# Patient Record
Sex: FEMALE | Race: WHITE | NOT HISPANIC OR LATINO | URBAN - METROPOLITAN AREA
[De-identification: names, ages, dates, MRNs, and addresses within clinical notes are randomized per-mention and may not be internally consistent; named-entity substitution may affect disease eponyms.]

---

## 2023-07-26 ENCOUNTER — APPOINTMENT (RX ONLY)
Dept: URBAN - METROPOLITAN AREA CLINIC 66 | Facility: CLINIC | Age: 43
Setting detail: DERMATOLOGY
End: 2023-07-26

## 2023-07-26 DIAGNOSIS — Z41.9 ENCOUNTER FOR PROCEDURE FOR PURPOSES OTHER THAN REMEDYING HEALTH STATE, UNSPECIFIED: ICD-10-CM

## 2023-07-26 PROCEDURE — ? COSMETIC CONSULTATION: SKIN TIGHTENING

## 2023-07-26 PROCEDURE — ? COSMETIC CONSULTATION: DYSPORT

## 2023-07-26 PROCEDURE — ? COSMETIC CONSULTATION: BOTOX

## 2023-07-26 PROCEDURE — ? DYSPORT

## 2023-07-26 ASSESSMENT — LOCATION DETAILED DESCRIPTION DERM
LOCATION DETAILED: RIGHT MEDIAL FRONTAL SCALP
LOCATION DETAILED: RIGHT LATERAL FOREHEAD
LOCATION DETAILED: LEFT INFERIOR TEMPLE
LOCATION DETAILED: GLABELLA
LOCATION DETAILED: LEFT SUPERIOR LATERAL FOREHEAD
LOCATION DETAILED: RIGHT FOREHEAD
LOCATION DETAILED: LEFT CENTRAL FRONTAL SCALP
LOCATION DETAILED: RIGHT SUPERIOR FOREHEAD
LOCATION DETAILED: LEFT SUPERIOR FOREHEAD
LOCATION DETAILED: LEFT MID TEMPLE
LOCATION DETAILED: RIGHT MID TEMPLE
LOCATION DETAILED: LEFT MEDIAL FRONTAL SCALP
LOCATION DETAILED: RIGHT CENTRAL EYEBROW
LOCATION DETAILED: LEFT MEDIAL EYEBROW
LOCATION DETAILED: RIGHT LATERAL FRONTAL SCALP
LOCATION DETAILED: LEFT FOREHEAD
LOCATION DETAILED: RIGHT SUPERIOR LATERAL FOREHEAD
LOCATION DETAILED: RIGHT MEDIAL EYEBROW
LOCATION DETAILED: LEFT LATERAL FOREHEAD
LOCATION DETAILED: RIGHT MEDIAL FOREHEAD
LOCATION DETAILED: RIGHT INFERIOR TEMPLE

## 2023-07-26 ASSESSMENT — LOCATION SIMPLE DESCRIPTION DERM
LOCATION SIMPLE: RIGHT EYEBROW
LOCATION SIMPLE: RIGHT FOREHEAD
LOCATION SIMPLE: LEFT FOREHEAD
LOCATION SIMPLE: GLABELLA
LOCATION SIMPLE: LEFT EYEBROW
LOCATION SIMPLE: RIGHT SCALP
LOCATION SIMPLE: LEFT TEMPLE
LOCATION SIMPLE: LEFT SCALP
LOCATION SIMPLE: RIGHT TEMPLE

## 2023-07-26 ASSESSMENT — LOCATION ZONE DERM
LOCATION ZONE: FACE
LOCATION ZONE: SCALP

## 2023-07-26 NOTE — PROCEDURE: DYSPORT
Consent: All questions answered.\\n\\n42 units / 126 units DYS\\n15% off 40 units or more July special applied today\\n\\nBOX B 2 units [6 DYS units]\\nBOX #1 18 units [54 DYS] LOT [id number] Exp: 2/28/2025 Consent: All questions answered.\\n\\n42 units / 126 units DYS\\n15% off 40 units or more July special applied today\\n\\nBOX B 2 units [6 DYS units]\\nBOX #1 18 units [54 DYS] LOT [id number] Exp: 2/28/2025

## 2023-08-11 ENCOUNTER — APPOINTMENT (RX ONLY)
Dept: URBAN - METROPOLITAN AREA CLINIC 66 | Facility: CLINIC | Age: 43
Setting detail: DERMATOLOGY
End: 2023-08-11

## 2023-08-11 DIAGNOSIS — Z41.9 ENCOUNTER FOR PROCEDURE FOR PURPOSES OTHER THAN REMEDYING HEALTH STATE, UNSPECIFIED: ICD-10-CM

## 2023-08-11 PROCEDURE — ? COSMETIC FOLLOW-UP

## 2023-08-11 PROCEDURE — ? COSMETIC CONSULTATION: LASER RESURFACING

## 2023-08-11 PROCEDURE — ? COSMETIC CONSULTATION - MICRO-NEEDLING

## 2023-08-11 NOTE — PROCEDURE: COSMETIC FOLLOW-UP
Price (Use Numbers Only, No Special Characters Or $): 0
Comments (Free Text): Satisfied with results
Detail Level: Zone

## 2023-08-25 ENCOUNTER — APPOINTMENT (RX ONLY)
Dept: URBAN - METROPOLITAN AREA CLINIC 66 | Facility: CLINIC | Age: 43
Setting detail: DERMATOLOGY
End: 2023-08-25

## 2023-08-25 DIAGNOSIS — Z41.9 ENCOUNTER FOR PROCEDURE FOR PURPOSES OTHER THAN REMEDYING HEALTH STATE, UNSPECIFIED: ICD-10-CM

## 2023-08-25 PROCEDURE — ? MICRONEEDLING

## 2023-08-25 NOTE — PROCEDURE: MICRONEEDLING
Post-Care Instructions: After the procedure, take precautions against sun exposure wearing SPF50+ with physical block, like zinc or titanium dioxide. Discussed ISDIN with price range of $50-60 with micro-ionized zinc and being non-comedogenic. Avoid sun for 4 weeks; 1 week strict avoidance or until all areas healed, whichever is longer. Do not apply sunscreen for 12 hours after the procedure. Do not apply make-up or topical treatments for 12 hours after the procedure. Do not apply anything for at least 1 hour following procedure. May wash face with gentle cleanser later tonight. Avoid alcohol based toners for 10-14 days. After 2-3 days AND once healed patients can return to their regular skin regimen.\\nPatient denied history of cold sores

## 2023-08-25 NOTE — PROCEDURE: MICRONEEDLING
Price (Use Numbers Only, No Special Characters Or $): 208 Price (Use Numbers Only, No Special Characters Or $): 911